# Patient Record
Sex: MALE | Race: BLACK OR AFRICAN AMERICAN | NOT HISPANIC OR LATINO | Employment: FULL TIME | URBAN - METROPOLITAN AREA
[De-identification: names, ages, dates, MRNs, and addresses within clinical notes are randomized per-mention and may not be internally consistent; named-entity substitution may affect disease eponyms.]

---

## 2018-09-27 ENCOUNTER — OFFICE VISIT (OUTPATIENT)
Dept: GASTROENTEROLOGY | Facility: CLINIC | Age: 38
End: 2018-09-27

## 2018-09-27 VITALS
HEIGHT: 71 IN | BODY MASS INDEX: 23.8 KG/M2 | DIASTOLIC BLOOD PRESSURE: 72 MMHG | TEMPERATURE: 98 F | SYSTOLIC BLOOD PRESSURE: 110 MMHG | WEIGHT: 170 LBS

## 2018-09-27 DIAGNOSIS — R13.10 DYSPHAGIA, UNSPECIFIED TYPE: Primary | ICD-10-CM

## 2018-09-27 PROCEDURE — 99203 OFFICE O/P NEW LOW 30 MIN: CPT | Performed by: INTERNAL MEDICINE

## 2018-09-27 RX ORDER — SODIUM CHLORIDE, SODIUM LACTATE, POTASSIUM CHLORIDE, CALCIUM CHLORIDE 600; 310; 30; 20 MG/100ML; MG/100ML; MG/100ML; MG/100ML
30 INJECTION, SOLUTION INTRAVENOUS CONTINUOUS
Status: CANCELLED | OUTPATIENT
Start: 2018-11-07

## 2018-09-27 NOTE — PROGRESS NOTES
Chief Complaint   Patient presents with   • Difficulty Swallowing   • Anorexia        Hong Thakkar is a  38 y.o. male here for an initial visit for dysphagia, anorexia    HPI this 38-year-old Afro-American male patient of [facility] presents with complaints of dysphagia for the past 3-6 months.  His symptoms however have progressed over the past week to where he is unable to obtain adequate nutrition.  He describes problems with pills as well as solid foods.  He has a sensation of sticking in his chest.  There is no report of regurgitation or nausea with vomiting.  He states his symptoms persist throughout the course of the day, upon getting ready for sleep he takes a Seroquel which actually causes an improvement in his appetite.  He will eat a meal just prior to retiring usually is associated 15 minutes afterwards.  The following day he has no appetite until that same evening.  He denies any difficulty with melena or bright red blood per rectum.  States his bowel pattern is intact.  He does have chronic reflux which he usually treats with Tums 2-3 times per week.  Family history is negative for ulcers or colorectal cancer.    Past Medical History:   Diagnosis Date   • Dysphagia        No current outpatient prescriptions on file.     No current facility-administered medications for this visit.        PRN Meds:.    No Known Allergies    Social History     Social History   • Marital status: Single     Spouse name: N/A   • Number of children: N/A   • Years of education: N/A     Occupational History   • Not on file.     Social History Main Topics   • Smoking status: Current Every Day Smoker     Packs/day: 1.00     Types: Cigarettes   • Smokeless tobacco: Not on file   • Alcohol use No   • Drug use: Unknown   • Sexual activity: Not on file     Other Topics Concern   • Not on file     Social History Narrative   • No narrative on file       History reviewed. No pertinent family history.    Review of Systems    Constitutional: Positive for unexpected weight change. Negative for activity change, appetite change and fatigue.   HENT: Negative for congestion, facial swelling, sore throat, trouble swallowing and voice change.    Eyes: Negative for photophobia and visual disturbance.   Respiratory: Negative for cough and choking.    Cardiovascular: Negative for chest pain.   Gastrointestinal: Negative for abdominal distention, abdominal pain, anal bleeding, blood in stool, constipation, diarrhea, nausea, rectal pain and vomiting.        Dysphagia   Endocrine: Negative for polyphagia.   Musculoskeletal: Negative for arthralgias, gait problem and joint swelling.   Skin: Negative for color change, pallor and rash.   Allergic/Immunologic: Negative for food allergies.   Neurological: Negative for speech difficulty and headaches.   Hematological: Does not bruise/bleed easily.   Psychiatric/Behavioral: Negative for agitation, confusion and sleep disturbance.       Vitals:    09/27/18 1509   BP: 110/72   Temp: 98 °F (36.7 °C)       Physical Exam   Constitutional: He is oriented to person, place, and time. He appears well-developed and well-nourished. No distress.   HENT:   Head: Normocephalic.   Mouth/Throat: Oropharynx is clear and moist. No oropharyngeal exudate.   Eyes: Conjunctivae and EOM are normal. No scleral icterus.   Neck: Normal range of motion. No thyromegaly present.   Cardiovascular: Normal rate and regular rhythm.    No murmur heard.  Pulmonary/Chest: Breath sounds normal. No respiratory distress. He has no wheezes. He has no rales.   Abdominal: Soft. Bowel sounds are normal. He exhibits no distension and no mass. There is no hepatosplenomegaly. There is no tenderness.   Musculoskeletal: Normal range of motion. He exhibits no edema or tenderness.   Lymphadenopathy:     He has no cervical adenopathy.   Neurological: He is alert and oriented to person, place, and time.   Skin: Skin is warm and dry. No rash noted. He is  not diaphoretic. No erythema.   Psychiatric: He has a normal mood and affect. His behavior is normal.   Vitals reviewed.      ASSESSMENT   #1 dysphagia: Not clearly defined as oropharyngeal or esophageal.  #2 weight loss  #3 GERD      PLAN  Schedule esophagram  Pending results schedule upper endoscopy  Encourage liquid nutritional supplements      ICD-10-CM ICD-9-CM   1. Dysphagia, unspecified type R13.10 787.20

## 2018-10-12 ENCOUNTER — HOSPITAL ENCOUNTER (OUTPATIENT)
Dept: GENERAL RADIOLOGY | Facility: HOSPITAL | Age: 38
Discharge: HOME | End: 2018-10-12
Attending: INTERNAL MEDICINE | Admitting: INTERNAL MEDICINE

## 2018-10-12 DIAGNOSIS — R13.10 DYSPHAGIA, UNSPECIFIED TYPE: ICD-10-CM

## 2018-10-12 PROCEDURE — 74220 X-RAY XM ESOPHAGUS 1CNTRST: CPT

## 2018-10-12 RX ADMIN — BARIUM SULFATE 135 ML: 980 POWDER, FOR SUSPENSION ORAL at 11:05

## 2018-10-12 RX ADMIN — BARIUM SULFATE 700 MG: 700 TABLET ORAL at 11:05

## 2018-10-12 RX ADMIN — ANTACID/ANTIFLATULENT 1 TABLET: 380; 550; 10; 10 GRANULE, EFFERVESCENT ORAL at 11:05

## 2018-10-12 RX ADMIN — BARIUM SULFATE 183 ML: 960 POWDER, FOR SUSPENSION ORAL at 11:05

## 2018-10-16 ENCOUNTER — TELEPHONE (OUTPATIENT)
Dept: GASTROENTEROLOGY | Facility: CLINIC | Age: 38
End: 2018-10-16

## 2018-10-19 NOTE — TELEPHONE ENCOUNTER
Call from pt.  Advise per esophagram report that no aspiration or deep laryngeal penetration was seen.  Narrowing of distal esophagus.  GE junction with slightly nonspecific irregular changes.      Advise per DR Gonzalez that recommend schedule EGD for further assessment as recommended.    Pt verb understanding.  EGD scheduled for 11/7.

## 2018-11-07 ENCOUNTER — HOSPITAL ENCOUNTER (OUTPATIENT)
Facility: HOSPITAL | Age: 38
Setting detail: HOSPITAL OUTPATIENT SURGERY
Discharge: HOME | End: 2018-11-07
Attending: INTERNAL MEDICINE | Admitting: INTERNAL MEDICINE

## 2018-11-07 ENCOUNTER — ANESTHESIA EVENT (OUTPATIENT)
Dept: GASTROENTEROLOGY | Facility: HOSPITAL | Age: 38
End: 2018-11-07

## 2018-11-07 ENCOUNTER — ANESTHESIA (OUTPATIENT)
Dept: GASTROENTEROLOGY | Facility: HOSPITAL | Age: 38
End: 2018-11-07

## 2018-11-07 VITALS
TEMPERATURE: 97.8 F | HEIGHT: 71 IN | HEART RATE: 57 BPM | WEIGHT: 169 LBS | DIASTOLIC BLOOD PRESSURE: 83 MMHG | BODY MASS INDEX: 23.66 KG/M2 | SYSTOLIC BLOOD PRESSURE: 119 MMHG | RESPIRATION RATE: 16 BRPM | OXYGEN SATURATION: 99 %

## 2018-11-07 DIAGNOSIS — R13.10 DYSPHAGIA, UNSPECIFIED TYPE: ICD-10-CM

## 2018-11-07 PROCEDURE — 88305 TISSUE EXAM BY PATHOLOGIST: CPT | Performed by: INTERNAL MEDICINE

## 2018-11-07 PROCEDURE — 87081 CULTURE SCREEN ONLY: CPT | Performed by: INTERNAL MEDICINE

## 2018-11-07 PROCEDURE — S0260 H&P FOR SURGERY: HCPCS | Performed by: INTERNAL MEDICINE

## 2018-11-07 PROCEDURE — 25010000002 PROPOFOL 10 MG/ML EMULSION: Performed by: ANESTHESIOLOGY

## 2018-11-07 PROCEDURE — 43239 EGD BIOPSY SINGLE/MULTIPLE: CPT | Performed by: INTERNAL MEDICINE

## 2018-11-07 RX ORDER — SODIUM CHLORIDE 0.9 % (FLUSH) 0.9 %
3-10 SYRINGE (ML) INJECTION AS NEEDED
Status: DISCONTINUED | OUTPATIENT
Start: 2018-11-07 | End: 2018-11-07 | Stop reason: HOSPADM

## 2018-11-07 RX ORDER — PROPOFOL 10 MG/ML
VIAL (ML) INTRAVENOUS CONTINUOUS PRN
Status: DISCONTINUED | OUTPATIENT
Start: 2018-11-07 | End: 2018-11-07 | Stop reason: SURG

## 2018-11-07 RX ORDER — SODIUM CHLORIDE 0.9 % (FLUSH) 0.9 %
3 SYRINGE (ML) INJECTION EVERY 12 HOURS SCHEDULED
Status: DISCONTINUED | OUTPATIENT
Start: 2018-11-07 | End: 2018-11-07 | Stop reason: HOSPADM

## 2018-11-07 RX ORDER — PANTOPRAZOLE SODIUM 40 MG/1
40 TABLET, DELAYED RELEASE ORAL
Status: DISCONTINUED | OUTPATIENT
Start: 2018-11-08 | End: 2018-11-07 | Stop reason: HOSPADM

## 2018-11-07 RX ORDER — SODIUM CHLORIDE, SODIUM LACTATE, POTASSIUM CHLORIDE, CALCIUM CHLORIDE 600; 310; 30; 20 MG/100ML; MG/100ML; MG/100ML; MG/100ML
30 INJECTION, SOLUTION INTRAVENOUS CONTINUOUS PRN
Status: DISCONTINUED | OUTPATIENT
Start: 2018-11-07 | End: 2018-11-07 | Stop reason: HOSPADM

## 2018-11-07 RX ORDER — PROPOFOL 10 MG/ML
VIAL (ML) INTRAVENOUS AS NEEDED
Status: DISCONTINUED | OUTPATIENT
Start: 2018-11-07 | End: 2018-11-07 | Stop reason: SURG

## 2018-11-07 RX ORDER — LIDOCAINE HYDROCHLORIDE 20 MG/ML
INJECTION, SOLUTION INFILTRATION; PERINEURAL AS NEEDED
Status: DISCONTINUED | OUTPATIENT
Start: 2018-11-07 | End: 2018-11-07 | Stop reason: SURG

## 2018-11-07 RX ORDER — SODIUM CHLORIDE, SODIUM LACTATE, POTASSIUM CHLORIDE, CALCIUM CHLORIDE 600; 310; 30; 20 MG/100ML; MG/100ML; MG/100ML; MG/100ML
30 INJECTION, SOLUTION INTRAVENOUS CONTINUOUS
Status: DISCONTINUED | OUTPATIENT
Start: 2018-11-07 | End: 2018-11-07 | Stop reason: HOSPADM

## 2018-11-07 RX ADMIN — LIDOCAINE HYDROCHLORIDE 50 MG: 20 INJECTION, SOLUTION INFILTRATION; PERINEURAL at 13:06

## 2018-11-07 RX ADMIN — PROPOFOL 50 MG: 10 INJECTION, EMULSION INTRAVENOUS at 13:15

## 2018-11-07 RX ADMIN — SODIUM CHLORIDE, POTASSIUM CHLORIDE, SODIUM LACTATE AND CALCIUM CHLORIDE 30 ML/HR: 600; 310; 30; 20 INJECTION, SOLUTION INTRAVENOUS at 12:51

## 2018-11-07 RX ADMIN — PROPOFOL 75 MG: 10 INJECTION, EMULSION INTRAVENOUS at 13:06

## 2018-11-07 RX ADMIN — PROPOFOL 50 MG: 10 INJECTION, EMULSION INTRAVENOUS at 13:12

## 2018-11-07 RX ADMIN — PROPOFOL 125 MCG/KG/MIN: 10 INJECTION, EMULSION INTRAVENOUS at 13:06

## 2018-11-07 RX ADMIN — PROPOFOL 50 MG: 10 INJECTION, EMULSION INTRAVENOUS at 13:10

## 2018-11-07 NOTE — H&P
[facility]  Pre Procedure History & Physical    Chief Complaint:   Dysphagia, weight loss, GERD    Subjective     HPI:   This 38-year-old male presents to the endoscopy suite for upper endoscopic evaluation.  He is complained of dysphagia for the past 6 months.  He also has issues with reflux and  weight loss.    Past Medical History:   Past Medical History:   Diagnosis Date   • Dysphagia        Past Surgical History:  No past surgical history on file.    Family History:  No family history on file.    Social History:   reports that he has been smoking Cigarettes.  He has been smoking about 1.00 pack per day. He does not have any smokeless tobacco history on file. He reports that he does not drink alcohol.    Medications:   No prescriptions prior to admission.       Allergies:  Patient has no known allergies.    ROS:    Pertinent items are noted in HPI, all other systems reviewed and negative     Objective     There were no vitals taken for this visit.    Physical Exam   Constitutional: Pt is oriented to person, place, and time and well-developed, well-nourished, and in no distress.   Mouth/Throat: Oropharynx is clear and moist.   Neck: Normal range of motion.   Cardiovascular: Normal rate, regular rhythm and normal heart sounds.    Pulmonary/Chest: Effort normal and breath sounds normal.   Abdominal: Soft. Nontender  Skin: Skin is warm and dry.   Psychiatric: Mood, memory, affect and judgment normal.     Assessment/Plan     Diagnosis:  Dysphagia  Weight loss  GERD    Anticipated Surgical Procedure:  EGD    The risks, benefits, and alternatives of this procedure have been discussed with the patient or the responsible party- the patient understands and agrees to proceed.

## 2018-11-07 NOTE — ANESTHESIA PREPROCEDURE EVALUATION
Anesthesia Evaluation     Patient summary reviewed                Airway   No difficulty expected  Dental      Pulmonary    (+) a smoker Current,   Cardiovascular     Rhythm: regular        Neuro/Psych  GI/Hepatic/Renal/Endo      Musculoskeletal     Abdominal    Substance History      OB/GYN          Other                        Anesthesia Plan    ASA 2     MAC     Anesthetic plan, all risks, benefits, and alternatives have been provided, discussed and informed consent has been obtained with: patient.

## 2018-11-07 NOTE — ANESTHESIA POSTPROCEDURE EVALUATION
Patient: Hong Thakkar    Procedure Summary     Date:  11/07/18 Room / Location:   [facility]    Anesthesia Start:  1259 Anesthesia Stop:  1325    Procedure:  ESOPHAGOGASTRODUODENOSCOPY with Bx's (N/A Esophagus) Diagnosis:       Esophagitis      Gastritis      Duodenitis      Gastric bezoar      Hiatal hernia      (Dysphagia, unspecified type [R13.10])    Surgeon:  Jesse Gonzalez MD Provider:  Agustin Carrillo MD    Anesthesia Type:  MAC ASA Status:  2          Anesthesia Type: MAC  Last vitals  BP   119/83 (11/07/18 1343)   Temp   36.6 °C (97.8 °F) (11/07/18 1244)   Pulse   57 (11/07/18 1343)   Resp   16 (11/07/18 1343)     SpO2   99 % (11/07/18 1343)     Post Anesthesia Care and Evaluation    Patient location during evaluation: PACU  Patient participation: complete - patient participated  Level of consciousness: awake  Pain score: 1  Pain management: adequate  Airway patency: patent  Anesthetic complications: No anesthetic complications  PONV Status: none  Cardiovascular status: acceptable  Respiratory status: acceptable  Hydration status: acceptable

## 2018-11-08 ENCOUNTER — TELEPHONE (OUTPATIENT)
Dept: GASTROENTEROLOGY | Facility: CLINIC | Age: 38
End: 2018-11-08

## 2018-11-08 DIAGNOSIS — R63.4 WEIGHT LOSS: Primary | ICD-10-CM

## 2018-11-08 DIAGNOSIS — R13.10 DYSPHAGIA, UNSPECIFIED TYPE: ICD-10-CM

## 2018-11-08 LAB
CYTO UR: NORMAL
LAB AP CASE REPORT: NORMAL
PATH REPORT.FINAL DX SPEC: NORMAL
PATH REPORT.GROSS SPEC: NORMAL
UREASE TISS QL: NEGATIVE

## 2018-11-08 RX ORDER — PANTOPRAZOLE SODIUM 40 MG/1
40 TABLET, DELAYED RELEASE ORAL DAILY
Qty: 30 TABLET | Refills: 3 | Status: SHIPPED | OUTPATIENT
Start: 2018-11-08

## 2018-11-08 NOTE — TELEPHONE ENCOUNTER
----- Message from Jesse GA MD sent at 11/8/2018 12:57 PM EST -----  Regarding: Biopsy results  Okay to call results, and patient continue/initiate PPI at 40 mg daily.  Offer him gastric emptying study to assess gastric function.  ----- Message -----  From: Lab, Background User  Sent: 11/8/2018  10:39 AM  To: Jesse GA MD

## 2018-11-08 NOTE — TELEPHONE ENCOUNTER
Called pt and advised per Dr Gonzalez that the duodenal bx was normal.  The stomach bx was benign and was neg for h pylori. The ge junction bx showed min chronic inflammation . The distal esophageal bx showed mild to mod acute inflammation.  He recommends to start ppi .  Advised pt we have escribed pantoprazole 40mg po daily to his [facility].  Also advised he recommends a gastric emptying study to assess function. Advised pt someone from [facility] would be calling to arrange. Pt verb understanding.

## 2018-11-21 ENCOUNTER — HOSPITAL ENCOUNTER (OUTPATIENT)
Dept: NUCLEAR MEDICINE | Facility: HOSPITAL | Age: 38
Discharge: HOME | End: 2018-11-21
Attending: INTERNAL MEDICINE

## 2018-11-21 DIAGNOSIS — R13.10 DYSPHAGIA, UNSPECIFIED TYPE: ICD-10-CM

## 2018-11-21 DIAGNOSIS — R63.4 WEIGHT LOSS: ICD-10-CM

## 2018-11-21 PROCEDURE — 0 TECHNETIUM SULFUR COLLOID: Performed by: INTERNAL MEDICINE

## 2018-11-21 PROCEDURE — A9541 TC99M SULFUR COLLOID: HCPCS | Performed by: INTERNAL MEDICINE

## 2018-11-21 PROCEDURE — 78264 GASTRIC EMPTYING IMG STUDY: CPT

## 2018-11-21 RX ADMIN — TECHNETIUM TC 99M SULFUR COLLOID 1 DOSE: KIT at 07:40

## 2018-11-28 ENCOUNTER — TELEPHONE (OUTPATIENT)
Dept: GASTROENTEROLOGY | Facility: CLINIC | Age: 38
End: 2018-11-28

## 2018-11-28 NOTE — TELEPHONE ENCOUNTER
----- Message from Jesse GA MD sent at 11/26/2018 12:09 PM EST -----  Regarding: Gastric emptying study results  Okay to call results, marked delay in emptying.  Would offer trial of Reglan 10 mg with meals and at bedtime.  Patient can follow-up in office to discuss benefits and side effects of medication prior to initiation.  ----- Message -----  From: Interface, Rad Results [place] [facility]  Sent: 11/21/2018   3:16 PM  To: Jesse GA MD

## 2018-11-30 NOTE — TELEPHONE ENCOUNTER
Returned pt's call and advised per Dr Gonzalez that his gastric emptying study did show marked delay emptying.  He recommends a trial of reglan 10mg with meals and at bedtime.  Pt can f/u to discuss benefits and side effects of med prior to initiation.  Pt verb understanding and made appt for 12/13 at 330pm.

## 2018-12-13 ENCOUNTER — TELEPHONE (OUTPATIENT)
Dept: GASTROENTEROLOGY | Facility: CLINIC | Age: 38
End: 2018-12-13

## 2018-12-13 RX ORDER — METOCLOPRAMIDE 10 MG/1
TABLET ORAL
Qty: 90 TABLET | Refills: 0 | Status: SHIPPED | OUTPATIENT
Start: 2018-12-13 | End: 2019-01-07 | Stop reason: DRUGHIGH

## 2018-12-13 NOTE — TELEPHONE ENCOUNTER
Pt missed office appt.  Asking if may start Reglan.  Confer with Dr Gonzalez.  May start reglan 10 mg 1 tab po TID with meals, #90, R0.  Pt to call office in 2 weeks with response.    Advise pt of same.  Advise to watch for tics/repetitive movement and notify this office immediately.  Stress importance of f/u call in 2 weeks - will only prescribe one month to start.  Verb understanding.

## 2019-01-03 ENCOUNTER — TELEPHONE (OUTPATIENT)
Dept: GASTROENTEROLOGY | Facility: CLINIC | Age: 39
End: 2019-01-03

## 2019-01-03 NOTE — TELEPHONE ENCOUNTER
"Call to pt.  States is calling reglan response as instructed with note of 12/13.  Has been taking as prescribed - not noting any side effects, but also not noting any improvement.  Continues to have sensation that food \"just sitting in my stomach\".      Advise will update Dr Gonzalez for guidance - pt verb understanding.  "

## 2019-01-03 NOTE — TELEPHONE ENCOUNTER
----- Message from Kavon Mcknight sent at 1/3/2019  1:26 PM EST -----  Regarding: Update on meds  Contact: [phone]  Please call to discuss.

## 2019-01-03 NOTE — TELEPHONE ENCOUNTER
Would increase Reglan dose to 10 mg 4 times a day, if not effective can increase the milligrams strength.  He needs to be on a low residue diet and drink plenty of liquids with meals.

## 2019-01-04 NOTE — TELEPHONE ENCOUNTER
Call from pt.  Advise per Dr Gonzalez that would increase reglan dose to 10 mg 4x/day.  If not effective can increase the mg strength.  Needs to be on low residue diet and drink plenty of liquids with meals.    Pt verb understanding.  States wishes to increase mg strength now instead of increasing frequency.  States doesn't think that will do any good, because doesn't eat that often, and when does eat - good just lays in my belly.    Advise pt that DR Gonzalez judiciously making med adjustments in order for pt to take safely.  Pt insists check if may increase mg strength now.  Question to DR Gonzalez.

## 2019-01-04 NOTE — TELEPHONE ENCOUNTER
Would still advise patient to take a dose before any meals and at bedtime and he can increase the Reglan to 20 mg per dose, inform him of the potential for extrapyramidal symptoms and to observe for this.

## 2019-01-07 RX ORDER — METOCLOPRAMIDE 10 MG/1
TABLET ORAL
Qty: 240 TABLET | Refills: 0 | Status: ON HOLD | OUTPATIENT
Start: 2019-01-07 | End: 2019-04-12

## 2019-01-07 NOTE — TELEPHONE ENCOUNTER
Call to pt.  Advise per Dr Gonzalez that would still advise to take a dose before any meals and at bedtime and can increase the Reglan to 20 mg/dose.  Advise to watch for any signs of tics, involuntary muscle movements, etc.      Advise will send 1 mo supply - notify this office of response.  Verb understanding.    Nina completed for reglan 10 mg 2 tab po ac meals and hs, #240, R0.

## 2019-01-08 NOTE — TELEPHONE ENCOUNTER
Call from pt.  States researched on line and very concerned about increasing  Reglan to 20 mg/dose due to possible side effects.     Advise that Dr Gonzalez had recommended to increase Reglan 10 mg to 4x/day and that IF no improvement, could increase milligrams strength.    Lengthy discussion re: above.  Pt states will increase Reglan to 10 mg 4x/day rather than 20 mg.  Will call back in 2-3 wks with response.      Update to Dr Gonzalez.

## 2019-02-05 ENCOUNTER — TELEPHONE (OUTPATIENT)
Dept: GASTROENTEROLOGY | Facility: CLINIC | Age: 39
End: 2019-02-05

## 2019-02-05 NOTE — TELEPHONE ENCOUNTER
----- Message from Megan Maciel sent at 2/5/2019  4:22 PM EST -----  Regarding: changes in med   Contact: [phone]  REGLAN IS NOT WORKING FOR THE PT

## 2019-02-05 NOTE — TELEPHONE ENCOUNTER
If patient not responding to Reglan, may consider domperidone as an alternative.  Supposed to have less of the side effect profile.  Still some noted.  He can follow-up to discuss this or we can prescribe this if he wishes.

## 2019-02-06 NOTE — TELEPHONE ENCOUNTER
Call to pt.  Advise per Dr Gonzalez that is not responding to Reglan, may consider domperidone as an alternative.   Supposed to have less of the side effect profile.  Still some noted.  Can f/u to discuss or can prescribe if wishes.    Advise pt must obtain domperidone thru [place] [facility].    Pt verb understanding.  Requesting appt with Dr Gonzalez.  Scheduled for 2/21 @ 4pm.

## 2019-02-21 ENCOUNTER — OFFICE VISIT (OUTPATIENT)
Dept: GASTROENTEROLOGY | Facility: CLINIC | Age: 39
End: 2019-02-21

## 2019-02-21 VITALS
WEIGHT: 171 LBS | SYSTOLIC BLOOD PRESSURE: 110 MMHG | BODY MASS INDEX: 23.94 KG/M2 | TEMPERATURE: 98.1 F | DIASTOLIC BLOOD PRESSURE: 78 MMHG | HEIGHT: 71 IN

## 2019-02-21 DIAGNOSIS — R13.10 DYSPHAGIA, UNSPECIFIED TYPE: ICD-10-CM

## 2019-02-21 DIAGNOSIS — R63.4 WEIGHT LOSS: ICD-10-CM

## 2019-02-21 DIAGNOSIS — K21.9 GASTROESOPHAGEAL REFLUX DISEASE, ESOPHAGITIS PRESENCE NOT SPECIFIED: Primary | ICD-10-CM

## 2019-02-21 PROCEDURE — 99214 OFFICE O/P EST MOD 30 MIN: CPT | Performed by: INTERNAL MEDICINE

## 2019-02-21 RX ORDER — SUCRALFATE 1 G/1
1 TABLET ORAL 4 TIMES DAILY
Qty: 120 TABLET | Refills: 5 | Status: ON HOLD | OUTPATIENT
Start: 2019-02-21 | End: 2019-04-12

## 2019-02-21 RX ORDER — CALCIUM CARBONATE 750 MG/1
750 TABLET, CHEWABLE ORAL DAILY
COMMUNITY

## 2019-02-21 NOTE — PROGRESS NOTES
Chief Complaint   Patient presents with   • Difficulty Swallowing        Hong Thakkar is a  39 y.o. male here for a follow up visit for GERD, dysphagia, weight loss    HPI this 39-year-old -American male patient of Dr. Ted Ford returns in follow-up since his endoscopic assessment in October.  He had evidence of retained contents in his stomach and a gastric emptying study was performed soon thereafter.  That study showed 52% residual at 4 hours.  He was placed on Reglan but felt this was not effective.  However, once he stopped the medication he noticed his symptoms did worsen.  We discussed options to include alternative prokinetic such as domperidone, or initiation of Carafate in addition to a proton pump inhibitor along with the Reglan to see if this combination affords significant relief.  He would like to try the Carafate first and call with a progress report in the near future.    Past Medical History:   Diagnosis Date   • Dysphagia        Current Outpatient Medications   Medication Sig Dispense Refill   • calcium carbonate EX (TUMS EX) 750 MG chewable tablet Chew 750 mg Daily.     • metoclopramide (REGLAN) 10 MG tablet Take 2 tablets by mouth before meals and at bedtime 240 tablet 0   • pantoprazole (PROTONIX) 40 MG EC tablet Take 1 tablet by mouth Daily. 30 tablet 3     No current facility-administered medications for this visit.        PRN Meds:.    No Known Allergies    Social History     Socioeconomic History   • Marital status: Single     Spouse name: Not on file   • Number of children: Not on file   • Years of education: Not on file   • Highest education level: Not on file   Social Needs   • Financial resource strain: Not on file   • Food insecurity - worry: Not on file   • Food insecurity - inability: Not on file   • Transportation needs - medical: Not on file   • Transportation needs - non-medical: Not on file   Occupational History   • Not on file   Tobacco Use   • Smoking status: Current  Every Day Smoker     Packs/day: 1.00     Types: Cigarettes   • Smokeless tobacco: Never Used   • Tobacco comment: smoke this morning    Substance and Sexual Activity   • Alcohol use: No   • Drug use: No   • Sexual activity: Defer   Other Topics Concern   • Not on file   Social History Narrative   • Not on file       History reviewed. No pertinent family history.    Review of Systems   Constitutional: Positive for unexpected weight change. Negative for activity change, appetite change and fatigue.   HENT: Negative for congestion, facial swelling, sore throat, trouble swallowing and voice change.    Eyes: Negative for photophobia and visual disturbance.   Respiratory: Negative for cough and choking.    Cardiovascular: Negative for chest pain.   Gastrointestinal: Negative for abdominal distention, abdominal pain, anal bleeding, blood in stool, constipation, diarrhea, nausea, rectal pain and vomiting.        GERD  Dysphagia   Endocrine: Negative for polyphagia.   Musculoskeletal: Negative for arthralgias, gait problem and joint swelling.   Skin: Negative for color change, pallor and rash.   Allergic/Immunologic: Negative for food allergies.   Neurological: Negative for speech difficulty and headaches.   Hematological: Does not bruise/bleed easily.   Psychiatric/Behavioral: Negative for agitation, confusion and sleep disturbance.       Vitals:    02/21/19 1612   BP: 110/78   Temp: 98.1 °F (36.7 °C)       Physical Exam   Constitutional: He is oriented to person, place, and time. He appears well-developed and well-nourished.   HENT:   Head: Normocephalic.   Mouth/Throat: Oropharynx is clear and moist.   Eyes: Conjunctivae and EOM are normal.   Neck: Normal range of motion.   Cardiovascular: Normal rate and regular rhythm.   Pulmonary/Chest: Breath sounds normal.   Abdominal: Soft. Bowel sounds are normal.   Musculoskeletal: Normal range of motion.   Neurological: He is alert and oriented to person, place, and time.   Skin:  Skin is warm and dry.   Psychiatric: He has a normal mood and affect. His behavior is normal.       ASSESSMENT   #1 GERD  #2 dysphagia  #3 gastroparesis  #4 weight loss      PLAN  Initiate Carafate 1 g before meals and at bedtime  Continue Reglan  Patient to add PPI if still symptomatic.  He will call with a progress report in 2-4 weeks.        ICD-10-CM ICD-9-CM   1. Gastroesophageal reflux disease, esophagitis presence not specified K21.9 530.81   2. Dysphagia, unspecified type R13.10 787.20   3. Weight loss R63.4 783.21

## 2019-03-04 ENCOUNTER — TELEPHONE (OUTPATIENT)
Dept: GASTROENTEROLOGY | Facility: CLINIC | Age: 39
End: 2019-03-04

## 2019-03-04 NOTE — TELEPHONE ENCOUNTER
----- Message from Franchesca Baldwin sent at 3/4/2019  3:56 PM EST -----  Regarding: pt called asking to speak with a nurse   Contact: [phone]  ..

## 2019-03-04 NOTE — TELEPHONE ENCOUNTER
Call to pt.  States has been taking carafate as instructed with o/v of 2/21/19.  States feels worse - cannot tolerate solid foods because feels that food does not go down.    Offer to send update to Dr Gonzalez for instructions.  Pt refuses - requests f/u appt.  Scheduled for 3/11 @ 1pm with DR Gonzalez.

## 2019-03-11 ENCOUNTER — OFFICE VISIT (OUTPATIENT)
Dept: GASTROENTEROLOGY | Facility: CLINIC | Age: 39
End: 2019-03-11

## 2019-03-11 VITALS
BODY MASS INDEX: 23.85 KG/M2 | TEMPERATURE: 97.9 F | SYSTOLIC BLOOD PRESSURE: 130 MMHG | HEIGHT: 71 IN | DIASTOLIC BLOOD PRESSURE: 82 MMHG | WEIGHT: 170.4 LBS

## 2019-03-11 DIAGNOSIS — R13.10 DYSPHAGIA, UNSPECIFIED TYPE: ICD-10-CM

## 2019-03-11 DIAGNOSIS — K31.84 GASTROPARESIS: ICD-10-CM

## 2019-03-11 DIAGNOSIS — K21.9 GASTROESOPHAGEAL REFLUX DISEASE, ESOPHAGITIS PRESENCE NOT SPECIFIED: Primary | ICD-10-CM

## 2019-03-11 PROCEDURE — 99213 OFFICE O/P EST LOW 20 MIN: CPT | Performed by: INTERNAL MEDICINE

## 2019-03-11 NOTE — PROGRESS NOTES
Chief Complaint   Patient presents with   • Difficulty Swallowing        Hong Thakkar is a  39 y.o. male here for a follow up visit for gastroparesis, reflux, dysphagia    HPI this 39-year-old -American male patient of Ted Ford MD returns in follow-up since his last visit in February.  We had discussed his gastric emptying issues and he Continues to take Reglan as well as pantoprazole.  He had tried Carafate but recounts that this did not effectively improve his symptoms and even caused some reflux issues so he stopped it.  Currently his complaints are delivery of material to his stomach specifically dysphagia to solid foods.  He was concerned that he might have esophageal cancer.  I reviewed his endoscopic findings and assured him that the anatomy did not reveal any evidence of cancer.  Biopsies of his distal esophagus did show inflammatory changes but no evidence of dysplastic cells.  We talked about further evaluation to include a video fluoroscopy swallow study and pending those results possible esophageal manometry.  I encouraged him to continue to use nutritional supplements to deliver calories.  If his gastric emptying problems persist despite medication I would consider referral to the digestive disease center for consideration of possible gastric pacemaker.    Past Medical History:   Diagnosis Date   • Dysphagia        Current Outpatient Medications   Medication Sig Dispense Refill   • calcium carbonate EX (TUMS EX) 750 MG chewable tablet Chew 750 mg Daily.     • metoclopramide (REGLAN) 10 MG tablet Take 2 tablets by mouth before meals and at bedtime 240 tablet 0   • pantoprazole (PROTONIX) 40 MG EC tablet Take 1 tablet by mouth Daily. 30 tablet 3   • sucralfate (CARAFATE) 1 g tablet Take 1 tablet by mouth 4 (Four) Times a Day. 120 tablet 5     No current facility-administered medications for this visit.        PRN Meds:.    No Known Allergies    Social History     Socioeconomic History   •  Marital status: Single     Spouse name: Not on file   • Number of children: Not on file   • Years of education: Not on file   • Highest education level: Not on file   Social Needs   • Financial resource strain: Not on file   • Food insecurity - worry: Not on file   • Food insecurity - inability: Not on file   • Transportation needs - medical: Not on file   • Transportation needs - non-medical: Not on file   Occupational History   • Not on file   Tobacco Use   • Smoking status: Current Every Day Smoker     Packs/day: 0.50     Types: Cigarettes     Start date: 2000   • Smokeless tobacco: Never Used   Substance and Sexual Activity   • Alcohol use: No   • Drug use: No   • Sexual activity: Defer   Other Topics Concern   • Not on file   Social History Narrative   • Not on file       No family history on file.    Review of Systems   Constitutional: Negative for activity change, appetite change, fatigue and unexpected weight change.   HENT: Negative for congestion, facial swelling, sore throat, trouble swallowing and voice change.    Eyes: Negative for photophobia and visual disturbance.   Respiratory: Negative for cough and choking.    Cardiovascular: Negative for chest pain.   Gastrointestinal: Positive for abdominal distention. Negative for abdominal pain, anal bleeding, blood in stool, constipation, diarrhea, nausea, rectal pain and vomiting.        Dysphagia  Postprandial bloating  Gastroparesis   Endocrine: Negative for polyphagia.   Musculoskeletal: Negative for arthralgias, gait problem and joint swelling.   Skin: Negative for color change, pallor and rash.   Allergic/Immunologic: Negative for food allergies.   Neurological: Negative for speech difficulty and headaches.   Hematological: Does not bruise/bleed easily.   Psychiatric/Behavioral: Negative for agitation, confusion and sleep disturbance.       Vitals:    03/11/19 1315   BP: 130/82   Temp: 97.9 °F (36.6 °C)       Physical Exam   Constitutional: He is  oriented to person, place, and time. He appears well-developed and well-nourished.   HENT:   Head: Normocephalic.   Mouth/Throat: Oropharynx is clear and moist.   Eyes: Conjunctivae and EOM are normal.   Neck: Normal range of motion.   Cardiovascular: Normal rate and regular rhythm.   Pulmonary/Chest: Breath sounds normal.   Abdominal: Soft. Bowel sounds are normal.   Musculoskeletal: Normal range of motion.   Neurological: He is alert and oriented to person, place, and time.   Skin: Skin is warm and dry.   Psychiatric: He has a normal mood and affect. His behavior is normal.       ASSESSMENT   #1 GERD: On PPI  #2 gastroparesis: On prokinetic  #3 dysphagia: Suspect physiologic component with negative endoscopic findings.      PLAN  Schedule video fluoroscopy swallow study with speech pathology  Pending results may consider esophageal manometry  We will call patient with results.  Continue current medical regimen      ICD-10-CM ICD-9-CM   1. Gastroesophageal reflux disease, esophagitis presence not specified K21.9 530.81   2. Gastroparesis K31.84 536.3   3. Dysphagia, unspecified type R13.10 787.20

## 2019-03-19 ENCOUNTER — HOSPITAL ENCOUNTER (OUTPATIENT)
Dept: GENERAL RADIOLOGY | Facility: HOSPITAL | Age: 39
Discharge: HOME | End: 2019-03-19
Admitting: INTERNAL MEDICINE

## 2019-03-19 DIAGNOSIS — R13.10 DYSPHAGIA, UNSPECIFIED TYPE: ICD-10-CM

## 2019-03-19 DIAGNOSIS — K21.9 GASTROESOPHAGEAL REFLUX DISEASE, ESOPHAGITIS PRESENCE NOT SPECIFIED: ICD-10-CM

## 2019-03-19 PROCEDURE — 92611 MOTION FLUOROSCOPY/SWALLOW: CPT

## 2019-03-19 PROCEDURE — 74230 X-RAY XM SWLNG FUNCJ C+: CPT

## 2019-03-19 RX ADMIN — BARIUM SULFATE 50 ML: 400 SUSPENSION ORAL at 13:45

## 2019-03-19 RX ADMIN — BARIUM SULFATE 4 ML: 980 POWDER, FOR SUSPENSION ORAL at 13:45

## 2019-03-19 RX ADMIN — BARIUM SULFATE 65 ML: 960 POWDER, FOR SUSPENSION ORAL at 13:44

## 2019-03-19 NOTE — MBS/VFSS/FEES
Outpatient Speech Language Pathology   Adult Swallow Initial Evaluation  [facility]     Patient Name: Hong Thakkar  : 1980  MRN: [id number]  Today's Date: 3/19/2019         Visit Date: 2019   Patient Active Problem List   Diagnosis   • Dysphagia        Past Medical History:   Diagnosis Date   • Dysphagia         Past Surgical History:   Procedure Laterality Date   • ENDOSCOPY N/A 2018    LA Grade C reflux esophagitis, Z line irreg, HH, gastritis, duodenitis.          Visit Dx:     ICD-10-CM ICD-9-CM   1. Gastroesophageal reflux disease, esophagitis presence not specified K21.9 530.81   2. Dysphagia, unspecified type R13.10 787.20           SLP Adult Swallow Evaluation - 19 1600        Rehab Evaluation    Document Type  evaluation   -AW    Subjective Information  no complaints   -AW    Patient Observations  alert;cooperative;agree to therapy   -AW    Patient Effort  good   -AW    Symptoms Noted During/After Treatment  none   -AW       General Information    Patient Profile Reviewed  yes   -AW    Pertinent History Of Current Problem  Pt has a h/o gastroparesis, GERD, and esophagitis. Pt c/o difficulty swallowing solids with epigastric pain noted. Pt has not had any voice changes or weight loss.    -AW    Current Method of Nutrition  regular textures;thin liquids;soft textures;pureed more difficulty with solids   -AW    Precautions/Limitations, Vision  WFL with corrective lenses   -AW    Precautions/Limitations, Hearing  WFL   -AW    Prior Level of Function-Communication  WFL   -AW    Prior Level of Function-Swallowing  no diet consistency restrictions   -AW    Plans/Goals Discussed with  patient;agreed upon   -AW    Barriers to Rehab  none identified   -AW    Patient's Goals for Discharge  return to regular diet   -AW       Pain Assessment    Additional Documentation  Pain Scale: Numbers Pre/Post-Treatment (Group)   -AW       Pain Scale: Numbers Pre/Post-Treatment    Pain Scale: Numbers,  Pretreatment  0/10 - no pain   -AW    Pain Scale: Numbers, Post-Treatment  0/10 - no pain   -AW       Oral Musculature and Cranial Nerve Assessment    Oral Motor General Assessment  WFL   -AW       General Eating/Swallowing Observations    Respiratory Support Currently in Use  room air   -AW    Eating/Swallowing Skills  self-fed;fed by SLP   -AW    Positioning During Eating  upright in chair   -AW       MBS/VFSS    Utensils Used  spoon;cup;straw   -AW    Consistencies Trialed  regular textures;soft textures;pureed;thin liquids mixed   -AW       MBS/VFSS Interpretation    Oral Prep Phase  WFL   -AW    Oral Transit Phase  WFL   -AW    Oral Residue  WFL   -AW    VFSS Summary  Pt exhibited an essentially functional swallow. Pt had mistiming inconsistently and pharyngeal residuals which were cleared with an additional swallow. Pt at times needed cues for this. No penetration or aspiration was noted with any consistency tested (thin, pureed, mech soft, mixed, and regular). Mastication was functional for solids. An esophageal scan showed slow esophageal clearance and an irregular barium column.    -AW       Initiation of Pharyngeal Swallow    Pharyngeal Phase  functional pharyngeal phase of swallowing   -AW       Esophageal Phase    Esophageal Phase  esophageal retention;irregular barium column   -AW       SLP Communication to Radiology    Summary Statement  Pt exhibited an essentially functional swallow. Pt had mistiming inconsistently and pharyngeal residuals which were cleared with an additional swallow. Pt at times needed cues for this. No penetration or aspiration was noted with any consistency tested (thin, pureed, mech soft, mixed, and regular). Mastication was functional for solids. An esophageal scan showed slow esophageal clearance and an irregular barium column.    -AW       Clinical Impression    SLP Swallowing Diagnosis  functional oral phase;functional pharyngeal phase;esophageal dysfunction   -AW     Functional Impact  no impact on function   -AW       Recommendations    SLP Diet Recommendation  regular textures;soft textures;thin liquids   -AW    Recommended Precautions and Strategies  upright posture during/after eating;small bites of food and sips of liquid;multiple swallows per bite of food;multiple swallows per sip of liquid   -AW    SLP Rec. for Method of Medication Administration  meds whole;with thin liquids   -AW      User Key  (r) = Recorded By, (t) = Taken By, (c) = Cosigned By    Initials Name Provider Type    Gail Santiago MS CCC-SLP Speech and Language Pathologist                        OP SLP Education     Row Name 03/19/19 1610       Education    Barriers to Learning  No barriers identified  -AW    Assessed  Learning needs;Learning motivation;Learning preferences;Learning readiness  -AW    Learning Motivation  Strong  -AW    Learning Method  Explanation;Demonstration;Teach back  -AW    Teaching Response  Verbalized understanding;Demonstrated understanding  -AW      User Key  (r) = Recorded By, (t) = Taken By, (c) = Cosigned By    Initials Name Effective Dates    Gail Santiago MS CCC-SLP 06/08/18 -               OP SLP Assessment/Plan - 03/19/19 1609        SLP Assessment    Functional Problems  Swallowing   -AW    Clinical Impression: Swallowing  Mild:;esophageal dysphagia   -AW    Prognosis  Good (comment)   -AW    Patient/caregiver participated in establishment of treatment plan and goals  Yes   -AW    Patient would benefit from skilled therapy intervention  No   -AW      User Key  (r) = Recorded By, (t) = Taken By, (c) = Cosigned By    Initials Name Provider Type    Gail Santiago MS CCC-SLP Speech and Language Pathologist              SLP Outcome Measures (last 72 hours)      SLP Outcome Measures     Row Name 03/19/19 1600             SLP Outcome Measures    Outcome Measure Used?  Adult NOMS  -AW         Adult FCM Scores    FCM Chosen  Swallowing  -AW      Swallowing FCM Score  7  -AW         User Key  (r) = Recorded By, (t) = Taken By, (c) = Cosigned By    Initials Name Effective Dates    AW Gail Paul, MS CCC-SLP 06/08/18 -                Time Calculation:   SLP Start Time: 1300  SLP Stop Time: 1415  SLP Time Calculation (min): 75 min    Therapy Charges for Today     Code Description Service Date Service Provider Modifiers Qty    [id number] HC ST MOTION FLUORO EVAL SWALLOW 5 3/19/2019 Gail Paul, MS CCC-SLP GN 1                   Gail Paul MS CCC-SLP  3/19/2019

## 2019-03-20 ENCOUNTER — TELEPHONE (OUTPATIENT)
Dept: GASTROENTEROLOGY | Facility: CLINIC | Age: 39
End: 2019-03-20

## 2019-03-20 NOTE — TELEPHONE ENCOUNTER
Called pt and advised per Dr Gonzalez that he reviewed the radiology report and speech path report.  Speech pathologist indicates oral and pharymgeal mechanisms intact with some esophageal dysfunction.  If pt wished to proceed can schedule esophageal manometry to better define his dysfunction.  Can f/u to discuss.      Pt verb understanding and wanted to f/u to discuss.  Pt made appt for 03/28 at 4 p with Dr Gonzalez.

## 2019-03-20 NOTE — TELEPHONE ENCOUNTER
----- Message from Jesse GA MD sent at 3/20/2019 12:46 PM EDT -----  Regarding: VFSS results  Reviewed radiology report and speech pathology report.  Speech pathologist indicates oral and pharyngeal mechanisms intact with some esophageal dysfunction.  If patient wishes to proceed can schedule esophageal manometry to better define his dysfunction.  Can follow-up in office to discuss if he wishes.  ----- Message -----  From: Gretchen, Rad Results [place] [facility]  Sent: 3/19/2019   5:57 PM  To: Jesse GA MD       5

## 2019-04-01 ENCOUNTER — OFFICE VISIT (OUTPATIENT)
Dept: GASTROENTEROLOGY | Facility: CLINIC | Age: 39
End: 2019-04-01

## 2019-04-01 VITALS
WEIGHT: 174.6 LBS | SYSTOLIC BLOOD PRESSURE: 126 MMHG | TEMPERATURE: 98.2 F | DIASTOLIC BLOOD PRESSURE: 72 MMHG | BODY MASS INDEX: 23.14 KG/M2 | HEIGHT: 73 IN

## 2019-04-01 DIAGNOSIS — K31.84 GASTROPARESIS: ICD-10-CM

## 2019-04-01 DIAGNOSIS — K21.9 GASTROESOPHAGEAL REFLUX DISEASE, ESOPHAGITIS PRESENCE NOT SPECIFIED: ICD-10-CM

## 2019-04-01 DIAGNOSIS — R13.10 DYSPHAGIA, UNSPECIFIED TYPE: Primary | ICD-10-CM

## 2019-04-01 PROCEDURE — 99214 OFFICE O/P EST MOD 30 MIN: CPT | Performed by: NURSE PRACTITIONER

## 2019-04-01 NOTE — PROGRESS NOTES
Chief Complaint   Patient presents with   • Difficulty Swallowing       Hong Thakkar is a  39 y.o. male here for a follow up visit for dysphagia.    HPI  38 yo m presents today for follow up visit for dysphagia, GERD and gastroparesis. He is a patient of Dr. Gonzalez. He was last seen in the office on 3/11/19. He has hx GERD and admits the Protonix 40 mg daily helps but he continues to have issues with dysphagia and reflux. He tried the carafate and didn't feel like it helped and only seemed to make his symptoms worse. He does also have gastroparesis and admits he does well with Reglan. He denies any unwanted side effects with it including no tardive dyskinesia. He admits his appetite is good and his weight is stable. He underwent VFSS on 3/19/19 that showed:  Pt exhibited an essentially functional swallow. Pt had mistiming inconsistently and pharyngeal residuals which were cleared with an additional swallow. Pt at times needed cues for this. No penetration or aspiration was noted with any consistency tested (thin, pureed, mech soft, mixed, and regular). Mastication was functional for solids. An esophageal scan showed slow esophageal clearance and an irregular barium column.    -AW      Past Medical History:   Diagnosis Date   • Dysphagia        Past Surgical History:   Procedure Laterality Date   • ENDOSCOPY N/A 11/7/2018    LA Grade C reflux esophagitis, Z line irreg, HH, gastritis, duodenitis.        Scheduled Meds:    Continuous Infusions:  No current facility-administered medications for this visit.     PRN Meds:.    No Known Allergies    Social History     Socioeconomic History   • Marital status: Single     Spouse name: Not on file   • Number of children: Not on file   • Years of education: Not on file   • Highest education level: Not on file   Tobacco Use   • Smoking status: Current Every Day Smoker     Packs/day: 0.50     Types: Cigarettes     Start date: 2000   • Smokeless tobacco: Never Used   Substance  and Sexual Activity   • Alcohol use: No   • Drug use: No   • Sexual activity: Defer       History reviewed. No pertinent family history.    Review of Systems   Constitutional: Negative for appetite change, chills, diaphoresis, fatigue, fever and unexpected weight change.   HENT: Positive for trouble swallowing. Negative for nosebleeds, postnasal drip, sore throat and voice change.    Respiratory: Negative for cough, choking, chest tightness, shortness of breath and wheezing.    Cardiovascular: Negative for chest pain.   Gastrointestinal: Positive for nausea. Negative for abdominal distention, abdominal pain, anal bleeding, blood in stool, constipation, diarrhea, rectal pain and vomiting.   Endocrine: Negative for polydipsia, polyphagia and polyuria.   Musculoskeletal: Negative for gait problem.   Skin: Negative for rash and wound.   Allergic/Immunologic: Negative for food allergies.   Neurological: Negative for dizziness, speech difficulty and light-headedness.   Psychiatric/Behavioral: Negative for confusion, self-injury, sleep disturbance and suicidal ideas.       Vitals:    04/01/19 1407   BP: 126/72   Temp: 98.2 °F (36.8 °C)       Physical Exam   Constitutional: He is oriented to person, place, and time. He appears well-developed and well-nourished. He does not appear ill. No distress.   HENT:   Head: Normocephalic.   Eyes: Pupils are equal, round, and reactive to light.   Cardiovascular: Normal rate, regular rhythm and normal heart sounds.   Pulmonary/Chest: Effort normal and breath sounds normal.   Abdominal: Soft. Bowel sounds are normal. He exhibits no distension and no mass. There is no hepatosplenomegaly. There is no tenderness. There is no rebound and no guarding. No hernia.   Musculoskeletal: Normal range of motion.   Neurological: He is alert and oriented to person, place, and time.   Skin: Skin is warm and dry.   Psychiatric: He has a normal mood and affect. His speech is normal and behavior is normal.  Judgment normal.       No images are attached to the encounter.    Assessment & Plan     1. Dysphagia, unspecified type  - Case Request; Standing  - Case Request    2. Gastroesophageal reflux disease, esophagitis presence not specified  - Case Request; Standing  - Case Request    3. Gastroparesis  - Case Request; Standing  - Case Request    Dysphagia is still an issue. I reviewed the findings of the VFSS with the patient today. Since he is still having issues with swallowing recommend Esophageal Manometry with Dr. Gonzalez for further evaluation. Patient is agreeable to the plan. Will have him increase the protonix to BID for now. Gastroparesis seems stable at this time on Reglan. Call office with any issues.

## 2019-04-12 ENCOUNTER — HOSPITAL ENCOUNTER (OUTPATIENT)
Facility: HOSPITAL | Age: 39
Setting detail: HOSPITAL OUTPATIENT SURGERY
Discharge: HOME | End: 2019-04-12
Attending: INTERNAL MEDICINE | Admitting: INTERNAL MEDICINE

## 2019-04-12 VITALS
RESPIRATION RATE: 16 BRPM | HEIGHT: 71 IN | DIASTOLIC BLOOD PRESSURE: 77 MMHG | OXYGEN SATURATION: 99 % | HEART RATE: 65 BPM | BODY MASS INDEX: 24.85 KG/M2 | SYSTOLIC BLOOD PRESSURE: 128 MMHG | TEMPERATURE: 98.5 F | WEIGHT: 177.5 LBS

## 2019-04-12 DIAGNOSIS — K31.84 GASTROPARESIS: ICD-10-CM

## 2019-04-12 DIAGNOSIS — K21.9 GASTROESOPHAGEAL REFLUX DISEASE, ESOPHAGITIS PRESENCE NOT SPECIFIED: ICD-10-CM

## 2019-04-12 DIAGNOSIS — R13.10 DYSPHAGIA, UNSPECIFIED TYPE: ICD-10-CM

## 2019-04-12 PROCEDURE — 91010 ESOPHAGUS MOTILITY STUDY: CPT | Performed by: INTERNAL MEDICINE

## 2019-04-19 ENCOUNTER — DOCUMENTATION (OUTPATIENT)
Dept: GASTROENTEROLOGY | Facility: CLINIC | Age: 39
End: 2019-04-19

## 2019-04-19 ENCOUNTER — TELEPHONE (OUTPATIENT)
Dept: GASTROENTEROLOGY | Facility: CLINIC | Age: 39
End: 2019-04-19

## 2019-04-19 DIAGNOSIS — R13.19 ESOPHAGEAL DYSPHAGIA: Primary | ICD-10-CM

## 2019-04-19 DIAGNOSIS — R63.4 WEIGHT LOSS: ICD-10-CM

## 2019-04-19 PROCEDURE — 91010 ESOPHAGUS MOTILITY STUDY: CPT | Performed by: INTERNAL MEDICINE

## 2019-04-19 NOTE — TELEPHONE ENCOUNTER
Can notify patient results did show some decrease in esophageal performance.  Would advise office follow-up to discuss these findings as well as treatment options.

## 2019-04-19 NOTE — PROGRESS NOTES
Procedure   Motility Study, Esophageal  Date/Time: 4/19/2019 3:33 PM  Performed by: Jesse Gonzalez MD  Authorized by: Jesse Gonzalez MD          Procedure: High resolution esophageal motility study    Indication: Dysphagia, weight loss, esophageal retention established on video fluoroscopy swallow study    Findings: LES- Hypotensive lower esophageal sphincter basal pressure (-4.4 mm Hg)                    Esophageal Body: Amplitude mean wave-normal (48.1 mmHg)                                                   Duration mean wave-normal (3.6 sec)                                                    Velocity-decreased (2.2 cm/s)                                                    Peristaltic swallows 70 %                                                     Failed swallows 30 %                      UES-Hypotensive upper esophageal sphincter basal pressure (11.8 mm Hg)      Impression: Evidence of diminished esophageal sphincter tone involving upper and lower esophageal sphincters                      Esophageal dysmotility reflected by reduced velocity and failed swallows.      Recommendation: Patient to follow-up in office setting to discuss dysmotility and treatment options i.e. nitrates or calcium channel blockers, antispasmodics.                                                                                                                               Jesse Gonzalez MD

## 2019-04-19 NOTE — TELEPHONE ENCOUNTER
pt called about test results   Received: Today      Call with results    Call patient   Message Contents   Franchesca Baldwin [address]   Phone Number: [phone]     Request to Dr Gonzalez.

## 2019-04-22 NOTE — TELEPHONE ENCOUNTER
Call to pt.  Advise per Dr Gonzalez that results did show some decrease in esophageal performance.  Would advise office f/u to discuss these findings as well as tx options.    Verb understanding.  Requesting afternoon appt.    Message to Manager.

## 2019-04-22 NOTE — TELEPHONE ENCOUNTER
Successfully scheduled for 5/6 @ 2:30 - call to pt to advise of same.  Verb understanding.    Update to Manager.

## 2019-05-06 ENCOUNTER — OFFICE VISIT (OUTPATIENT)
Dept: GASTROENTEROLOGY | Facility: CLINIC | Age: 39
End: 2019-05-06

## 2019-05-06 VITALS
TEMPERATURE: 98.5 F | DIASTOLIC BLOOD PRESSURE: 70 MMHG | HEIGHT: 71 IN | BODY MASS INDEX: 24.67 KG/M2 | WEIGHT: 176.2 LBS | SYSTOLIC BLOOD PRESSURE: 126 MMHG

## 2019-05-06 DIAGNOSIS — R13.10 DYSPHAGIA, UNSPECIFIED TYPE: Primary | ICD-10-CM

## 2019-05-06 PROCEDURE — 99214 OFFICE O/P EST MOD 30 MIN: CPT | Performed by: INTERNAL MEDICINE

## 2019-05-06 NOTE — PROGRESS NOTES
Chief Complaint   Patient presents with   • Difficulty Swallowing        Hong Thakkar is a  39 y.o. male here for a follow up visit for GERD, dysphagia    HPI this 39-year-old -American male patient of Dr. Ted Ford returns in follow-up since he had undergone esophageal manometry studies on April 19.  That evaluation showed hypotensive LES and UES with normal esophageal body amplitude and duration but diminished velocity.  He also had 70% peristaltic swallows and 30% failed swallows.  We talked about options for treatment including peppermint oil, antispasmodics, and even nitrates or calcium channel blockers although I suspect his response will be variable given the hypotonic state of his esophagus.  I also encouraged him to consider evaluation at the [facility] motility center to see if they could provide any further input or recommendations.  He is amenable to this.  He will start with the use of Altoid aids one taken by mouth before meals and if this is not successful I will prescribe Levsin sublingual to be taken every 6 hours as needed.    Past Medical History:   Diagnosis Date   • Dysphagia    • GERD (gastroesophageal reflux disease)        Current Outpatient Medications   Medication Sig Dispense Refill   • calcium carbonate EX (TUMS EX) 750 MG chewable tablet Chew 750 mg Daily.     • pantoprazole (PROTONIX) 40 MG EC tablet Take 1 tablet by mouth Daily. 30 tablet 3     No current facility-administered medications for this visit.        PRN Meds:.    No Known Allergies    Social History     Socioeconomic History   • Marital status: Single     Spouse name: Not on file   • Number of children: Not on file   • Years of education: Not on file   • Highest education level: Not on file   Tobacco Use   • Smoking status: Current Every Day Smoker     Packs/day: 0.50     Types: Cigarettes     Start date: 2000   • Smokeless tobacco: Never Used   Substance and Sexual Activity   • Alcohol use: No   •  Drug use: No   • Sexual activity: Defer       History reviewed. No pertinent family history.    Review of Systems   Constitutional: Negative for activity change, appetite change, fatigue and unexpected weight change.   HENT: Negative for congestion, facial swelling, sore throat, trouble swallowing and voice change.    Eyes: Negative for photophobia and visual disturbance.   Respiratory: Negative for cough and choking.    Cardiovascular: Negative for chest pain.   Gastrointestinal: Negative for abdominal distention, abdominal pain, anal bleeding, blood in stool, constipation, diarrhea, nausea, rectal pain and vomiting.        Dysphagia   Endocrine: Negative for polyphagia.   Musculoskeletal: Negative for arthralgias, gait problem and joint swelling.   Skin: Negative for color change, pallor and rash.   Allergic/Immunologic: Negative for food allergies.   Neurological: Negative for speech difficulty and headaches.   Hematological: Does not bruise/bleed easily.   Psychiatric/Behavioral: Negative for agitation, confusion and sleep disturbance.       Vitals:    05/06/19 1447   BP: 126/70   Temp: 98.5 °F (36.9 °C)       Physical Exam   Constitutional: He is oriented to person, place, and time. He appears well-developed and well-nourished.   HENT:   Head: Normocephalic.   Mouth/Throat: Oropharynx is clear and moist.   Eyes: Conjunctivae and EOM are normal.   Neck: Normal range of motion.   Cardiovascular: Normal rate and regular rhythm.   Pulmonary/Chest: Breath sounds normal.   Abdominal: Soft. Bowel sounds are normal.   Musculoskeletal: Normal range of motion.   Neurological: He is alert and oriented to person, place, and time.   Skin: Skin is warm and dry.   Psychiatric: He has a normal mood and affect. His behavior is normal.       ASSESSMENT   #1 GERD  #2 dysphagia: Evidence of dysmotility  #3 gastroparesis      PLAN  Patient to try Altoids peppermint, if not effective will provide Levsin sublingual  Office to help  facilitate appointment with [facility] motility service      ICD-10-CM ICD-9-CM   1. Dysphagia, unspecified type R13.10 787.20

## 2020-01-17 NOTE — TELEPHONE ENCOUNTER
Plan   Advised of findings, use inhaler as needed and monitor - recheck if symptoms return   Instructed to call if problem worsens or does not improve within the next 24 hours otherwise, Return if symptoms worsen or fail to improve..        "See note of 1/3.    Call to pt.  States has been taking reglan 10 mg 4x/day without relief of abd bloating, early satiety.    States understands from on line research that not safe to take reglan 20 mg 4x/day.      Reports BM's without problem.  Asking how to manage ongoing symptoms - suggests having repeat GES and \"taking reglan just prior to study to show it doesn't work\".    Update/questions to DR Gonzalez.      "

## (undated) DEVICE — FRCP BX RADJAW4 NDL 2.8 240CM LG OG BX40

## (undated) DEVICE — TUBING, SUCTION, 1/4" X 10', STRAIGHT: Brand: MEDLINE

## (undated) DEVICE — SHEATH CATH MANOSHIELD ESOPH

## (undated) DEVICE — Device: Brand: DEFENDO AIR/WATER/SUCTION AND BIOPSY VALVE

## (undated) DEVICE — CANN NASL CO2 TRULINK W/O2 A/

## (undated) DEVICE — BITEBLOCK OMNI BLOC